# Patient Record
Sex: MALE | Race: WHITE | NOT HISPANIC OR LATINO | Employment: UNEMPLOYED | URBAN - METROPOLITAN AREA
[De-identification: names, ages, dates, MRNs, and addresses within clinical notes are randomized per-mention and may not be internally consistent; named-entity substitution may affect disease eponyms.]

---

## 2022-10-23 ENCOUNTER — APPOINTMENT (OUTPATIENT)
Dept: CT IMAGING | Facility: CLINIC | Age: 42
End: 2022-10-23
Attending: EMERGENCY MEDICINE
Payer: OTHER GOVERNMENT

## 2022-10-23 ENCOUNTER — HOSPITAL ENCOUNTER (EMERGENCY)
Facility: CLINIC | Age: 42
Discharge: HOME | End: 2022-10-23
Attending: EMERGENCY MEDICINE | Admitting: EMERGENCY MEDICINE
Payer: OTHER GOVERNMENT

## 2022-10-23 VITALS
DIASTOLIC BLOOD PRESSURE: 47 MMHG | RESPIRATION RATE: 17 BRPM | TEMPERATURE: 97.4 F | HEART RATE: 93 BPM | OXYGEN SATURATION: 96 % | SYSTOLIC BLOOD PRESSURE: 102 MMHG

## 2022-10-23 DIAGNOSIS — S09.90XA CLOSED HEAD INJURY, INITIAL ENCOUNTER: ICD-10-CM

## 2022-10-23 DIAGNOSIS — F10.920 ALCOHOLIC INTOXICATION WITHOUT COMPLICATION (H): ICD-10-CM

## 2022-10-23 LAB
ETHANOL SERPL-MCNC: 0.36 G/DL
HOLD SPECIMEN: NORMAL
HOLD SPECIMEN: NORMAL

## 2022-10-23 PROCEDURE — 250N000009 HC RX 250

## 2022-10-23 PROCEDURE — 70450 CT HEAD/BRAIN W/O DYE: CPT

## 2022-10-23 PROCEDURE — 99285 EMERGENCY DEPT VISIT HI MDM: CPT | Mod: 25

## 2022-10-23 PROCEDURE — 82077 ASSAY SPEC XCP UR&BREATH IA: CPT | Performed by: EMERGENCY MEDICINE

## 2022-10-23 PROCEDURE — 96372 THER/PROPH/DIAG INJ SC/IM: CPT | Performed by: EMERGENCY MEDICINE

## 2022-10-23 PROCEDURE — 250N000011 HC RX IP 250 OP 636: Performed by: EMERGENCY MEDICINE

## 2022-10-23 PROCEDURE — 72125 CT NECK SPINE W/O DYE: CPT

## 2022-10-23 PROCEDURE — 36415 COLL VENOUS BLD VENIPUNCTURE: CPT | Performed by: EMERGENCY MEDICINE

## 2022-10-23 RX ORDER — WATER 10 ML/10ML
INJECTION INTRAMUSCULAR; INTRAVENOUS; SUBCUTANEOUS
Status: COMPLETED
Start: 2022-10-23 | End: 2022-10-23

## 2022-10-23 RX ORDER — OLANZAPINE 10 MG/2ML
10 INJECTION, POWDER, FOR SOLUTION INTRAMUSCULAR ONCE
Status: COMPLETED | OUTPATIENT
Start: 2022-10-23 | End: 2022-10-23

## 2022-10-23 RX ORDER — OLANZAPINE 5 MG/1
5 TABLET, ORALLY DISINTEGRATING ORAL ONCE
Status: DISCONTINUED | OUTPATIENT
Start: 2022-10-23 | End: 2022-10-23

## 2022-10-23 RX ADMIN — OLANZAPINE 10 MG: 10 INJECTION, POWDER, FOR SOLUTION INTRAMUSCULAR at 01:25

## 2022-10-23 RX ADMIN — WATER 10 ML: 1 INJECTION INTRAMUSCULAR; INTRAVENOUS; SUBCUTANEOUS at 01:25

## 2022-10-23 ASSESSMENT — ACTIVITIES OF DAILY LIVING (ADL)
ADLS_ACUITY_SCORE: 35

## 2022-10-23 NOTE — ED NOTES
Pt arrived in restraints. Attempted to have restraints removed while transferring pt to ER cart. However, pt immediately started grabbing and swinging out at staff. Pt also yelling at staff (often in [place]) and making derogatory statements about staff's ethnicity.

## 2022-10-23 NOTE — ED NOTES
Bed: BH3  Expected date:   Expected time:   Means of arrival:   Comments:  Dong 533 42M etoh, uncooperative, violent towards ems, fall, hit head eta 0033

## 2022-10-23 NOTE — ED TRIAGE NOTES
Pt was stumbling outside his hotel room and had a witnessed fall striking his head on a stone pillar. Pt became combative with police and EMS. Attempted to remove restraints on arrival pt began swinging/grabbing at staff. Pt placed back into restraints.

## 2022-10-23 NOTE — ED PROVIDER NOTES
History     Chief Complaint:    Alcohol Intoxication (Pt was seen stumbling outside his hotel and fell striking his head on a stone pillar. Pt soon became combative with EMS and needed to be restrained. Attempted to remove pt from restraints on arrival but immediately starts swinging out at staff and yelling out.)       LIANNE Quan is a 42 year old male who presents with alcohol intoxication.  He apparently was outside of his hotel and stumbling around some and fell back striking the back of his head on a stone pillar.  EMS was called and the patient was found to be intoxicated and was combative with them, requiring him to be restrained.  Patient does endorse alcohol use.  He denies any other complaints.    Allergies:  No Known Allergies     Medications:    No current outpatient medications on file.      Past Medical History:    No past medical history on file.    There are no problems to display for this patient.       Past Surgical History:    No past surgical history on file.     Family History:    family history is not on file.    Social History:       PCP: No primary care provider on file.     Review of Systems   Unable to perform ROS: Mental status change         Physical Exam   Patient Vitals for the past 24 hrs:   BP Temp Temp src Pulse Resp SpO2   10/23/22 0600 123/73 -- -- 105 15 98 %   10/23/22 0500 120/78 -- -- 112 18 97 %   10/23/22 0430 -- -- -- (!) 121 22 93 %   10/23/22 0400 132/84 -- -- 107 15 95 %   10/23/22 0326 (!) 142/85 -- -- (!) 122 21 (!) 85 %   10/23/22 0226 (!) 147/94 -- -- -- -- 93 %   10/23/22 0156 (!) 146/96 -- -- -- -- 90 %   10/23/22 0130 136/81 -- -- 104 -- 92 %   10/23/22 0108 -- 97.4  F (36.3  C) Temporal -- -- 92 %   10/23/22 0058 (!) 154/79 -- -- (!) 127 18 --        Physical Exam  General: Appears intoxicated, restrained  Head: Abrasion to posterior scalp.  No lacerations.  Mouth/Throat: Oropharynx is clear and moist.   Eyes: Conjunctivae are normal.   Neck: C-collar  in place.  CV: Normal rate and regular rhythm.    Resp: Effort normal and breath sounds normal. No respiratory distress.   GI: Soft. There is no tenderness.  No rebound or guarding.  Normal bowel sounds.   MSK: Normal range of motion.  Neuro: The patient is alert but intoxicated.  Skin: Skin is warm and dry.       Emergency Department Course     Imaging:  Cervical spine CT w/o contrast   Final Result   IMPRESSION:   HEAD CT:   1.  No acute intracranial process.      CERVICAL SPINE CT:   1.  No CT evidence for acute fracture or post traumatic subluxation.   2.  Degenerative changes, as above.      Head CT w/o contrast   Final Result   IMPRESSION:   HEAD CT:   1.  No acute intracranial process.      CERVICAL SPINE CT:   1.  No CT evidence for acute fracture or post traumatic subluxation.   2.  Degenerative changes, as above.             Laboratory:    Labs Ordered and Resulted from Time of ED Arrival to Time of ED Departure   ETHYL ALCOHOL LEVEL - Abnormal       Result Value    Alcohol ethyl 0.36 (*)         Interventions:    Medications   OLANZapine (zyPREXA) injection 10 mg (10 mg Intramuscular Given 10/23/22 0125)   sterile water (preservative free) injection (10 mLs  Given 10/23/22 0125)        Emergency Department Course:  Past medical records, nursing notes, and vitals reviewed.  I performed an exam of the patient and obtained history, as documented above.    I rechecked the patient. . Patient was signed out to Dr. Ordonez with repeat evaluation pending..    Impression & Plan      Medical Decision Making:  Patient presents due to alcohol intoxication and head injury.  He apparently was outside of his hotel intoxicated and fell back hitting his head on a pillar.  Patient was agitated and combative with EMS and was restrained during transport.  On arrival he continued to be belligerent and agitated, ultimately requiring IM Zyprexa.  We were able to obtain CT scan of the head and neck which did not show any signs of  concerning acute process.  Patient signed out with the plan for repeat evaluation and likely discharge once clinically appropriate.      Diagnosis:    ICD-10-CM    1. Alcoholic intoxication without complication (H)  F10.920       2. Closed head injury, initial encounter  S09.90XA                10/23/2022   Tad Tucker MD [place]stal, John A, MD  10/23/22 0655

## 2022-10-23 NOTE — ED NOTES
"Attempted to give pt oral zyprexa but pt yelling \"free me\" and \"fuck you\" while attempting to explain reason for restraints and criteria for them to be removed. Pt shows no evidence of learning. Dr. Tucker updated and order for IM received.  "

## 2022-10-23 NOTE — ED NOTES
A few minutes after pt removed from restraints. Pt developed periods of apnea and O2 sats into the low 80s%. Pt very difficult to awaken even with sternal rub. Dr. Tucker notified and at bedside. Placed on oxymask at 6L.

## 2022-10-23 NOTE — ED NOTES
Assumed care at this time.    Pt back from CT. Continues to need supplemental O2 via oxymask.   Pt intermittently restless and attempting multiple times to remove mask.  NPA placed on right nare to facilitate ventilation as Sat's frequently dropping to mid 80's. Pt became increasingly restless and agitated attempting to remove NPA and c- collar. MD was notified and NPA removed.  Pt repositioned and is maintaining sat's >90%. Will continue to monitor.    Kelsea Ortega RN,.......................................... 10/23/2022   3:32 AM

## 2022-10-23 NOTE — ED NOTES
Pt continues to pull against restraints but it appears it is d/t being uncomfortable rather than combative. Pt mumbles agreement to be cooperative and have restraints removed.

## 2022-10-23 NOTE — ED NOTES
Pt taken to CT on transport monitor. Pt continues to have periods of apnea and needs to be prompted to take a deep breath.

## 2022-10-23 NOTE — ED PROVIDER NOTES
Patient care signed out to me by Dr. Tucker.  In brief patient is a 42-year-old male who presents after alcohol intoxication.  He is clinically sober while under my care.  He is ambulatory with steady gait.  He is tolerating oral intake well.  He will be returning to his hotel today.  Stable on final recheck.  After all questions answered and return precautions understood, discharged home.      ICD-10-CM    1. Alcoholic intoxication without complication (H)  F10.920       2. Closed head injury, initial encounter  S09.90XA              Teodoro Ordonez MD  10/23/22 1039

## 2022-10-23 NOTE — ED NOTES
Pt woke with verbal stimulus. Gait steady, foggy on events that lead up to him being here. Dr. Ordonez notified for reassess for discharge

## 2022-10-23 NOTE — ED NOTES
Report taken from Kelsea GOODMAN.  Confirmed that overnight pt was restrained and 1:1.  Restraint flowsheet reviewed by writer and critical care time documented

## 2022-10-23 NOTE — ED NOTES
"Pt continues to pull against restraints and ask for them to be removed. Reviewed with pt why he was placed into restraints and the criteria that he must show that he is not a danger to staff. When pt asked if he will not attempt to harm staff his response was \"release me and we will find out.\"  "